# Patient Record
Sex: FEMALE | Race: WHITE | NOT HISPANIC OR LATINO | Employment: OTHER | URBAN - METROPOLITAN AREA
[De-identification: names, ages, dates, MRNs, and addresses within clinical notes are randomized per-mention and may not be internally consistent; named-entity substitution may affect disease eponyms.]

---

## 2022-05-31 ENCOUNTER — CONSULTATION/EVALUATION (OUTPATIENT)
Dept: URBAN - METROPOLITAN AREA CLINIC 39 | Facility: CLINIC | Age: 87
End: 2022-05-31

## 2022-05-31 DIAGNOSIS — H04.123: ICD-10-CM

## 2022-05-31 DIAGNOSIS — H25.813: ICD-10-CM

## 2022-05-31 DIAGNOSIS — H18.513: ICD-10-CM

## 2022-05-31 PROCEDURE — 92025AV CORNEAL TOPOGRAPHY, AV

## 2022-05-31 PROCEDURE — 92286 ANT SGM IMG I&R SPECLR MIC: CPT

## 2022-05-31 PROCEDURE — V2799PMN IMPRIMIS PRED-MOXI-NEPAF 5ML

## 2022-05-31 PROCEDURE — 92015 DETERMINE REFRACTIVE STATE: CPT

## 2022-05-31 PROCEDURE — 99204 OFFICE O/P NEW MOD 45 MIN: CPT

## 2022-05-31 PROCEDURE — 92136TC INTERFEROMETRY - TECHNICAL COMPONENT

## 2022-05-31 ASSESSMENT — VISUAL ACUITY
OS_CC: J3
OD_CC: J2
OD_SC: CF 6FT
OD_SC: <J10
OS_SC: <J10
OS_SC: CF 6FT
OS_BAT: 20/400
OS_CC: 20/30
OD_CC: 20/50+2
OD_BAT: 20/400

## 2022-05-31 ASSESSMENT — TONOMETRY
OS_IOP_MMHG: 16
OD_IOP_MMHG: 14

## 2022-05-31 NOTE — PATIENT DISCUSSION
advised that she needs to keep them out got 1 week then come back in for repeat So's then she can re insert them and take out 2 days prior to surgery OU.

## 2022-06-21 ENCOUNTER — PRE-OP/H&P (OUTPATIENT)
Dept: URBAN - METROPOLITAN AREA CLINIC 39 | Facility: CLINIC | Age: 87
End: 2022-06-21

## 2022-06-21 ENCOUNTER — POST-OP (OUTPATIENT)
Dept: URBAN - METROPOLITAN AREA CLINIC 39 | Facility: CLINIC | Age: 87
End: 2022-06-21

## 2022-06-21 ENCOUNTER — SURGERY/PROCEDURE (OUTPATIENT)
Dept: URBAN - METROPOLITAN AREA CLINIC 39 | Facility: CLINIC | Age: 87
End: 2022-06-21

## 2022-06-21 DIAGNOSIS — H04.123: ICD-10-CM

## 2022-06-21 DIAGNOSIS — Z96.1: ICD-10-CM

## 2022-06-21 DIAGNOSIS — H18.513: ICD-10-CM

## 2022-06-21 DIAGNOSIS — H25.813: ICD-10-CM

## 2022-06-21 PROCEDURE — 6698454AV REMOVE CATARACT, INSERT ADVANCED LENS (SX ONLY)

## 2022-06-21 PROCEDURE — 99211T TECH SERVICE

## 2022-06-21 PROCEDURE — 66999LNSR LENSAR LASER FOR CAT SX

## 2022-06-21 ASSESSMENT — VISUAL ACUITY: OS_SC: 20/60-2

## 2022-06-21 ASSESSMENT — TONOMETRY: OS_IOP_MMHG: 29

## 2022-06-21 NOTE — PATIENT DISCUSSION
Patient advised of the right to post-operative care by the surgeon. Patient is fully informed of, and agreed to, co-management with their primary optometric physician. Post-operative care by the surgeon is not medically necessary and co-management is clinically appropriate. Patient has received itemization of fees related to cataract surgery. Transfer of care letter completed for the patient. Transfer care of the left eye to Dr. Gus Murdock on 06/21/22. Patient instructed to call immediately if any new distortion, blurring, decreased vision or eye pain.

## 2022-06-21 NOTE — PATIENT DISCUSSION
IOP a bit high this am PO.  diamox 500 mg PO in office today.  add AgP TID OS today and BID OS tomorrow and see Dr Loyola Dance for one day check.

## 2022-06-28 ENCOUNTER — POST-OP (OUTPATIENT)
Dept: URBAN - METROPOLITAN AREA CLINIC 39 | Facility: CLINIC | Age: 87
End: 2022-06-28

## 2022-06-28 ENCOUNTER — PRE-OP/H&P (OUTPATIENT)
Dept: URBAN - METROPOLITAN AREA CLINIC 39 | Facility: CLINIC | Age: 87
End: 2022-06-28

## 2022-06-28 ENCOUNTER — SURGERY/PROCEDURE (OUTPATIENT)
Dept: URBAN - METROPOLITAN AREA CLINIC 39 | Facility: CLINIC | Age: 87
End: 2022-06-28

## 2022-06-28 DIAGNOSIS — Z96.1: ICD-10-CM

## 2022-06-28 DIAGNOSIS — H25.811: ICD-10-CM

## 2022-06-28 PROCEDURE — 99211T TECH SERVICE

## 2022-06-28 PROCEDURE — 6698454AV REMOVE CATARACT, INSERT ADVANCED LENS (SX ONLY)

## 2022-06-28 PROCEDURE — 66999LNSR LENSAR LASER FOR CAT SX

## 2022-06-28 PROCEDURE — 65772LRI LRI DURING CAT SX

## 2022-06-28 RX ORDER — BRIMONIDINE TARTRATE 1 MG/ML: 1 SOLUTION/ DROPS OPHTHALMIC TWICE A DAY

## 2022-06-28 ASSESSMENT — VISUAL ACUITY
OD_BAT: 20/400
OS: J6
OS_SC: J1
OS_SC: 20/40
OD_SC: 20/50
OS_SC: 20/30-2
OD_SC: J12
OD_SC: CF 6FT

## 2022-06-28 ASSESSMENT — TONOMETRY
OS_IOP_MMHG: 12
OS_IOP_MMHG: 13
OD_IOP_MMHG: 29

## 2022-06-28 NOTE — PATIENT DISCUSSION
Patient advised of the right to post-operative care by the surgeon. Patient is fully informed of, and agreed to, co-management with their primary optometric physician. Post-operative care by the surgeon is not medically necessary and co-management is clinically appropriate. Patient has received itemization of fees related to cataract surgery. Transfer of care letter completed for the patient. Transfer care of right eye to Dr. Piedad Herrera on 6/28/22. Patient instructed to call immediately if any new distortion, blurring, decreased vision or eye pain.

## 2022-06-28 NOTE — PATIENT DISCUSSION
elevated IOP OD noted today at same day PO exam, 6/28/22, Diamox 500 given and patient to begin Alphagan P OD bid; follow with Dr. Malena Concepcion.